# Patient Record
Sex: MALE | Race: OTHER | HISPANIC OR LATINO | Employment: UNEMPLOYED | URBAN - METROPOLITAN AREA
[De-identification: names, ages, dates, MRNs, and addresses within clinical notes are randomized per-mention and may not be internally consistent; named-entity substitution may affect disease eponyms.]

---
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
## 2024-12-06 ENCOUNTER — HOSPITAL ENCOUNTER (EMERGENCY)
Facility: HOSPITAL | Age: 19
Discharge: HOME | End: 2024-12-06
Attending: EMERGENCY MEDICINE

## 2024-12-06 VITALS
SYSTOLIC BLOOD PRESSURE: 144 MMHG | HEIGHT: 74 IN | BODY MASS INDEX: 25.67 KG/M2 | RESPIRATION RATE: 18 BRPM | WEIGHT: 200 LBS | TEMPERATURE: 99 F | DIASTOLIC BLOOD PRESSURE: 82 MMHG | HEART RATE: 92 BPM | OXYGEN SATURATION: 100 %

## 2024-12-06 DIAGNOSIS — L60.0 INGROWN RIGHT BIG TOENAIL: Primary | ICD-10-CM

## 2024-12-06 PROCEDURE — 99284 EMERGENCY DEPT VISIT MOD MDM: CPT

## 2024-12-06 PROCEDURE — 63600175 PHARM REV CODE 636 W HCPCS

## 2024-12-06 PROCEDURE — 25000003 PHARM REV CODE 250

## 2024-12-06 RX ORDER — MUPIROCIN 20 MG/G
OINTMENT TOPICAL 3 TIMES DAILY
Qty: 1 G | Refills: 0 | Status: SHIPPED | OUTPATIENT
Start: 2024-12-06

## 2024-12-06 RX ORDER — LIDOCAINE HYDROCHLORIDE 10 MG/ML
10 INJECTION, SOLUTION INFILTRATION; PERINEURAL
Status: COMPLETED | OUTPATIENT
Start: 2024-12-06 | End: 2024-12-06

## 2024-12-06 RX ORDER — NAPROXEN 500 MG/1
500 TABLET ORAL 2 TIMES DAILY
Qty: 20 TABLET | Refills: 0 | Status: SHIPPED | OUTPATIENT
Start: 2024-12-06

## 2024-12-06 RX ORDER — MUPIROCIN 20 MG/G
1 OINTMENT TOPICAL
Status: COMPLETED | OUTPATIENT
Start: 2024-12-06 | End: 2024-12-06

## 2024-12-06 RX ADMIN — MUPIROCIN 1 TUBE: 20 OINTMENT TOPICAL at 04:12

## 2024-12-06 RX ADMIN — LIDOCAINE HYDROCHLORIDE 10 ML: 10 INJECTION, SOLUTION INFILTRATION; PERINEURAL at 03:12

## 2024-12-06 NOTE — DISCHARGE INSTRUCTIONS
Por favor, regrese al Departamento de Emergencias si tiene síntomas nuevos o que empeoran, joellen fiebre, dolor en el pecho, dificultad para respirar, pérdida de conciencia, mareos, debilidad o cualquier otra inquietud.     Por favor, consulte con mazariegos médico de atención primaria dentro de la semana. Si no tiene darrin, puede comunicarse con el que figura en mazariegos documentación de debby o también puede llamar al mostrador de citas de la [facility] al [phone] para programar isela gumaro con darrin.     Por favor, tome todos los medicamentos según lo prescrito.

## 2024-12-06 NOTE — ED PROVIDER NOTES
Encounter Date: 12/6/2024       History     Chief Complaint   Patient presents with    Ingrown Toenail     Pt with ingrown toenail x 6 months. No drainage at present.      18 y/o male with no PMHx presents to the ED for emergent evaluation of 6 month hx of ingrown toe nail to R great toe.  Patient reports intermittent drainage from the toenail.  He denies any associated trauma, fall, head trauma, LOC.  He denies any fever, chills, chest pain, shortness of breath, abdominal pain, nausea, vomiting, diarrhea, dysuria, hematuria.  No other symptoms reported.    The history is provided by the patient. The history is limited by a language barrier (Jose G Denise #[id number]). A  was used.     Review of patient's allergies indicates:  No Known Allergies  Past Medical History:   Diagnosis Date    Patient denies medical problems      History reviewed. No pertinent surgical history.  Family History   Problem Relation Name Age of Onset    No Known Problems Mother      No Known Problems Father       Social History     Tobacco Use    Smoking status: Never    Smokeless tobacco: Never   Substance Use Topics    Alcohol use: Never     Review of Systems   Constitutional:  Negative for chills and fever.   HENT:  Negative for congestion, ear pain, rhinorrhea and sore throat.    Eyes:  Negative for redness.   Respiratory:  Negative for cough and shortness of breath.    Cardiovascular:  Negative for chest pain.   Gastrointestinal:  Negative for abdominal pain, diarrhea, nausea and vomiting.   Genitourinary:  Negative for decreased urine volume, difficulty urinating, dysuria, frequency, hematuria and urgency.   Musculoskeletal:  Negative for back pain and neck pain.   Skin:  Negative for rash.        (+) ingrown toe nail to R great nail   Neurological:  Negative for headaches.   Psychiatric/Behavioral:  Negative for confusion.        Physical Exam     Initial Vitals [12/06/24 1437]   BP Pulse Resp Temp SpO2   (!) 144/82 92  18 98.7 °F (37.1 °C) 100 %      MAP       --         Physical Exam    Nursing note and vitals reviewed.  Constitutional: He appears well-developed and well-nourished. He is not diaphoretic.  Non-toxic appearance. No distress.   HENT:   Head: Normocephalic and atraumatic.   Right Ear: Hearing, tympanic membrane, external ear and ear canal normal. Tympanic membrane is not perforated, not erythematous and not bulging.   Left Ear: Hearing, tympanic membrane, external ear and ear canal normal. Tympanic membrane is not perforated, not erythematous and not bulging.   Nose: Nose normal. Mouth/Throat: Uvula is midline and oropharynx is clear and moist.   Eyes: Conjunctivae and EOM are normal.   Neck: Neck supple.   Normal range of motion.   Full passive range of motion without pain.     Cardiovascular:            Pulses:       Radial pulses are 2+ on the right side and 2+ on the left side.        Dorsalis pedis pulses are 2+ on the right side and 2+ on the left side.   Musculoskeletal:      Cervical back: Full passive range of motion without pain, normal range of motion and neck supple. No rigidity.      Comments: Ingrown toenail to lateral aspect of R great toe nail bed. Full ROM of bilateral toes, bilateral ankles, bilateral knees, bilateral hips. Patient able to ambulate into the room. Strength and sensation intact to bilateral lower extremities.      Neurological: He is alert. No cranial nerve deficit.   Neuro intact.  Strength and sensation intact to bilateral upper and lower extremities.   Skin: Skin is warm and dry. No rash noted.         ED Course   Nail Removal    Date/Time: 2024 7:40 PM    Performed by: Elisa Barnes PA-C  Authorized by: Jerardo Sevilla MD  Consent Done: Yes  Consent: Verbal consent obtained.  Risks and benefits: risks, benefits and alternatives were discussed  Consent given by: patient  Patient identity confirmed:  and name  Location: right foot  Location details: right big  toe  Anesthesia: digital block    Anesthesia:  Local Anesthetic: lidocaine 1% without epinephrine  Amount removed: partial  Wedge excision of skin of nail fold: yes  Nail bed sutured: no  Nail matrix removed: none  Removed nail replaced and anchored: no  Dressing: antibiotic ointment and dressing applied  Patient tolerance: Patient tolerated the procedure well with no immediate complications        Labs Reviewed - No data to display       Imaging Results    None          Medications   LIDOcaine HCL 10 mg/ml (1%) injection 10 mL (10 mLs Infiltration Given by Other 12/6/24 1611)   mupirocin 2 % ointment 1 Tube (1 Tube Topical (Top) Given 12/6/24 4686)     Medical Decision Making  This is a 18 y/o male with no PMHx presents to the ED for emergent evaluation of 6 month hx of ingrown toe nail to R great toe.  Patient reports intermittent drainage from the toenail.  He denies any associated trauma, fall, head trauma, LOC.  He denies any fever, chills, chest pain, shortness of breath, abdominal pain, nausea, vomiting, diarrhea, dysuria, hematuria.  No other symptoms reported.    On physical exam, patient is well-appearing and in no acute distress.  Nontoxic appearing.  Lungs are clear to auscultation bilaterally.  Abdomen is soft and nontender.  No guarding, rigidity, rebound.  2+ radial pulses bilaterally.  Posterior oropharynx is not erythematous.  No edema or exudate.  Uvula midline.  Bilateral tympanic membrane is normal.  No erythema, bulging, or perforations.  Neuro intact.  Strength and sensation intact bilateral upper and lower extremities. 2+ DP pulses bilaterally. Ingrown toenail to lateral aspect of R great toe nail bed. Full ROM of bilateral toes, bilateral ankles, bilateral knees, bilateral hips. Patient able to ambulate into the room. Strength and sensation intact to bilateral lower extremities.  I removed a partial aspect of the right lateral nail of the right great toe.  Patient tolerated the procedure well  with no acute complications.  Bleeding controlled.  Wound was irrigated well and cleansed.  Bactroban ointment applied.  Bandage is applied.  Will discharge patient on naproxen, and Bactroban ointment.  Ambulatory referral to podiatry ordered.  Urged prompt follow-up with Podiatry and PCP for further evaluation.    Strict return precautions given. I discussed with the patient/family the diagnosis, treatment plan, indications for return to the emergency department, and for expected follow-up. The patient/family verbalized an understanding. The patient/family is asked if there are any questions or concerns. We discuss the case, until all issues are addressed to the patient/family's satisfaction. Patient/family understands and is agreeable to the plan. Patient is stable and ready for discharge.      Risk  Prescription drug management.                                      Clinical Impression:  Final diagnoses:  [L60.0] Ingrown right big toenail (Primary)          ED Disposition Condition    Discharge Stable          ED Prescriptions       Medication Sig Dispense Start Date End Date Auth. Provider    naproxen (NAPROSYN) 500 MG tablet Take 1 tablet (500 mg total) by mouth 2 (two) times daily. 20 tablet 12/6/2024 -- Elisa Barnes PA-C    mupirocin (BACTROBAN) 2 % ointment Apply topically 3 (three) times daily. 1 g 12/6/2024 -- Elisa Barnes PA-C          Follow-up Information       Follow up With Specialties Details Why Contact Info    [facility] -  Schedule an appointment as soon as possible for a visit in 2 days for further evaluation [address] [place] [address]  [phone]      [facility] Emergency Medicine In 2 days If symptoms worsen [address]  [facility] [place] [id number] [phone]             Elisa Barnes PA-C  12/06/24 2068

## 2024-12-06 NOTE — Clinical Note
"Erich Denney" Lopez Reich was seen and treated in our emergency department on 12/6/2024.  He may return to work on 12/07/2024.       If you have any questions or concerns, please don't hesitate to call.      Elisa Barnes PA-C"